# Patient Record
Sex: MALE | Race: WHITE | NOT HISPANIC OR LATINO | Employment: FULL TIME | URBAN - METROPOLITAN AREA
[De-identification: names, ages, dates, MRNs, and addresses within clinical notes are randomized per-mention and may not be internally consistent; named-entity substitution may affect disease eponyms.]

---

## 2021-10-11 ENCOUNTER — APPOINTMENT (OUTPATIENT)
Dept: GENERAL RADIOLOGY | Facility: CLINIC | Age: 51
End: 2021-10-11
Attending: EMERGENCY MEDICINE
Payer: COMMERCIAL

## 2021-10-11 PROCEDURE — 99285 EMERGENCY DEPT VISIT HI MDM: CPT | Mod: 25

## 2021-10-11 PROCEDURE — 71046 X-RAY EXAM CHEST 2 VIEWS: CPT

## 2021-10-12 ENCOUNTER — HOSPITAL ENCOUNTER (EMERGENCY)
Facility: CLINIC | Age: 51
Discharge: HOME | End: 2021-10-12
Attending: EMERGENCY MEDICINE | Admitting: EMERGENCY MEDICINE
Payer: COMMERCIAL

## 2021-10-12 VITALS
RESPIRATION RATE: 16 BRPM | SYSTOLIC BLOOD PRESSURE: 134 MMHG | DIASTOLIC BLOOD PRESSURE: 91 MMHG | HEART RATE: 55 BPM | OXYGEN SATURATION: 99 % | TEMPERATURE: 97.9 F

## 2021-10-12 DIAGNOSIS — R05.9 COUGH: ICD-10-CM

## 2021-10-12 DIAGNOSIS — R07.89 ATYPICAL CHEST PAIN: ICD-10-CM

## 2021-10-12 LAB
ANION GAP SERPL CALCULATED.3IONS-SCNC: 6 MMOL/L (ref 3–14)
BUN SERPL-MCNC: 18 MG/DL (ref 7–30)
CALCIUM SERPL-MCNC: 8.7 MG/DL (ref 8.5–10.1)
CHLORIDE BLD-SCNC: 100 MMOL/L (ref 94–109)
CO2 SERPL-SCNC: 28 MMOL/L (ref 20–32)
CREAT SERPL-MCNC: 1.09 MG/DL (ref 0.66–1.25)
ERYTHROCYTE [DISTWIDTH] IN BLOOD BY AUTOMATED COUNT: 11.9 % (ref 10–15)
GFR SERPL CREATININE-BSD FRML MDRD: 79 ML/MIN/1.73M2
GLUCOSE BLD-MCNC: 90 MG/DL (ref 70–99)
HCT VFR BLD AUTO: 39 % (ref 40–53)
HGB BLD-MCNC: 13.8 G/DL (ref 13.3–17.7)
HOLD SPECIMEN: NORMAL
MCH RBC QN AUTO: 32.1 PG (ref 26.5–33)
MCHC RBC AUTO-ENTMCNC: 35.4 G/DL (ref 31.5–36.5)
MCV RBC AUTO: 91 FL (ref 78–100)
PLATELET # BLD AUTO: 251 10E3/UL (ref 150–450)
POTASSIUM BLD-SCNC: 3.2 MMOL/L (ref 3.4–5.3)
RBC # BLD AUTO: 4.3 10E6/UL (ref 4.4–5.9)
SODIUM SERPL-SCNC: 134 MMOL/L (ref 133–144)
TROPONIN I SERPL-MCNC: <0.015 UG/L (ref 0–0.04)
WBC # BLD AUTO: 6.7 10E3/UL (ref 4–11)

## 2021-10-12 PROCEDURE — 80048 BASIC METABOLIC PNL TOTAL CA: CPT | Performed by: EMERGENCY MEDICINE

## 2021-10-12 PROCEDURE — 36415 COLL VENOUS BLD VENIPUNCTURE: CPT | Performed by: EMERGENCY MEDICINE

## 2021-10-12 PROCEDURE — 84484 ASSAY OF TROPONIN QUANT: CPT | Performed by: EMERGENCY MEDICINE

## 2021-10-12 PROCEDURE — 85027 COMPLETE CBC AUTOMATED: CPT | Performed by: EMERGENCY MEDICINE

## 2021-10-12 RX ORDER — LOSARTAN POTASSIUM 50 MG/1
50 TABLET ORAL DAILY
Qty: 30 TABLET | Refills: 0 | Status: SHIPPED | OUTPATIENT
Start: 2021-10-12

## 2021-10-12 RX ORDER — DOXYCYCLINE 100 MG/1
100 CAPSULE ORAL 2 TIMES DAILY
Qty: 20 CAPSULE | Refills: 0 | Status: SHIPPED | OUTPATIENT
Start: 2021-10-12 | End: 2021-10-22

## 2021-10-12 RX ORDER — BENZONATATE 100 MG/1
200 CAPSULE ORAL 3 TIMES DAILY PRN
Qty: 15 CAPSULE | Refills: 0 | Status: SHIPPED | OUTPATIENT
Start: 2021-10-12

## 2021-10-12 ASSESSMENT — ENCOUNTER SYMPTOMS
SHORTNESS OF BREATH: 1
FEVER: 0
COUGH: 1

## 2021-10-12 NOTE — ED PROVIDER NOTES
History   Chief Complaint:  Shortness of breath    HPI   Geoff Infante is a 51 year old male with history of hypertension who presents with shortness of breath. Per the patient, he has had shortness of breath and chest wall pain. Three weeks ago he developed a cough without blood and 5-6 days later developed the other symptoms, along with dyspnea on exertion. He called nurse triage who recommended he present to the ER. He is COVID vaccinated. He denies current fevers and leg swelling. He also denies recent surgeries and a personal history of blood clots.    Review of Systems   Constitutional: Negative for fever.   Respiratory: Positive for cough and shortness of breath.    Cardiovascular: Positive for chest pain. Negative for leg swelling.   All other systems reviewed and are negative.      Allergies:  The patient has no known allergies.     Medications:  Lisinopril  Chlorthalidone     Past Medical History:     Hypertension    Past Surgical History:    Hernia repair    Social History:  The patient was unaccompanied to the ER  The patient works at [employer].    Physical Exam     Patient Vitals for the past 24 hrs:   BP Temp Temp src Pulse Resp SpO2   10/11/21 2136 (!) 133/96 97.9  F (36.6  C) Temporal 78 16 98 %       Physical Exam  Vitals reviewed.   HENT:      Head: Normocephalic.   Cardiovascular:      Rate and Rhythm: Normal rate and regular rhythm.   Pulmonary:      Effort: Pulmonary effort is normal. No respiratory distress.      Breath sounds: Normal breath sounds.   Abdominal:      General: Abdomen is flat. Bowel sounds are normal.      Palpations: Abdomen is soft.   Skin:     General: Skin is warm.      Capillary Refill: Capillary refill takes less than 2 seconds.   Neurological:      General: No focal deficit present.      Mental Status: He is alert and oriented to person, place, and time.           Emergency Department Course   ECG  ECG obtained at 2312, ECG read at 0201  Normal sinus rhythm  Normal  ECG   Rate 61 bpm. OH interval 152 ms. QRS duration 90 ms. QT/QTc 406/408 ms. P-R-T axes 39 46 11.     Imaging:    Chest XR,  PA & LAT   Final Result   IMPRESSION: Heart size is normal. Lungs are clear. No pneumothorax or pleural effusion.          The above imaging workup was performed.   Report per radiology    Laboratory:  Labs Ordered and Resulted from Time of ED Arrival Up to the Time of Departure from the ED   CBC WITH PLATELETS - Abnormal; Notable for the following components:       Result Value    RBC Count 4.30 (*)     Hematocrit 39.0 (*)     All other components within normal limits   BASIC METABOLIC PANEL - Abnormal; Notable for the following components:    Potassium 3.2 (*)     All other components within normal limits   TROPONIN I - Normal   EXTRA BLUE TOP TUBE   EXTRA RED TOP TUBE   EXTRA GREEN TOP (LITHIUM HEPARIN) TUBE   EXTRA PURPLE TOP TUBE   EXTRA TUBE    Narrative:     The following orders were created for panel order [place] Draw.  Procedure                               Abnormality         Status                     ---------                               -----------         ------                     Extra Blue Top Tube[[id number]]                              Final result               Extra Red Top Tube[[id number]]                               Final result               Extra Green Top (Lithium...[[id number]]                      Final result               Extra Purple Top Tube[[id number]]                            Final result                 Please view results for these tests on the individual orders.      Emergency Department Course:  Reviewed:  I reviewed nursing notes, vitals and past medical history    Assessments:  0154 I obtained history and examined the patient as noted above.     Disposition:  The patient was discharged to home.     Impression & Plan         Medical Decision Making:  Patient presents with cough and shortness of breath.  Patient is well-appearing with a normal  respiratory rate and normal oxygen saturation.  No wheezing auscultated.  Patient was given some cough medication with some improvement in symptoms.  X-rays negative for pneumonia.  Patient is Covid vaccinated.  Patient is having reassurance cough medication and follow-up with primary care.  Chest pain is atypical seems to be associated with coughing.  Patient is PERC negative and no work-up for PE was recommended.  Due to ongoing coughing with phlegm production as well as more than 10 to 10 days of coughing patient was offered antibiotics as a bridge to follow-up with primary care.    Diagnosis:    ICD-10-CM    1. Cough  R05.9    2. Atypical chest pain  R07.89        Discharge Medications:  New Prescriptions    BENZONATATE (TESSALON) 100 MG CAPSULE    Take 2 capsules (200 mg) by mouth 3 times daily as needed for cough    DOXYCYCLINE MONOHYDRATE (MONODOX) 100 MG CAPSULE    Take 1 capsule (100 mg) by mouth 2 times daily for 10 days    LOSARTAN (COZAAR) 50 MG TABLET    Take 1 tablet (50 mg) by mouth daily       Scribe Disclosure:  Bruce FAUSTIN, am serving as a scribe at 1:36 AM on 10/12/2021 to document services personally performed by Ziggy Kruger MD based on my observations and the provider's statements to me.        Ziggy Kruger MD  10/14/21 7835

## 2021-10-12 NOTE — DISCHARGE INSTRUCTIONS
We are recommending an antibiotic due to cough ongoing for 2 weeks.  Your chest x-ray is normal and your lab work is unremarkable.  Consider also lisinopril as a cause for ongoing coughing.  Complete course of antibiotic as well as cough medication use when needed.  If still coughing consider stopping lisinopril and starting losartan.  Follow-up with your regular doctor for reassessment and come in with severe increase in chest pain or shortness of breath.

## 2021-10-12 NOTE — ED TRIAGE NOTES
Pt reports that since the 21st of Sept. For the last few days pt is having coughing fits more frequently. Pt has been having constant chest discomfort. Denies shortness of breath at this time. ABC intact.

## 2021-10-13 LAB
ATRIAL RATE - MUSE: 61 BPM
DIASTOLIC BLOOD PRESSURE - MUSE: NORMAL MMHG
INTERPRETATION ECG - MUSE: NORMAL
P AXIS - MUSE: 39 DEGREES
PR INTERVAL - MUSE: 152 MS
QRS DURATION - MUSE: 90 MS
QT - MUSE: 406 MS
QTC - MUSE: 408 MS
R AXIS - MUSE: 46 DEGREES
SYSTOLIC BLOOD PRESSURE - MUSE: NORMAL MMHG
T AXIS - MUSE: 11 DEGREES
VENTRICULAR RATE- MUSE: 61 BPM

## 2024-09-14 ENCOUNTER — HOSPITAL ENCOUNTER (EMERGENCY)
Facility: CLINIC | Age: 54
Discharge: HOME | End: 2024-09-14
Attending: EMERGENCY MEDICINE | Admitting: EMERGENCY MEDICINE
Payer: COMMERCIAL

## 2024-09-14 VITALS
OXYGEN SATURATION: 99 % | BODY MASS INDEX: 33.14 KG/M2 | WEIGHT: 223.77 LBS | TEMPERATURE: 97.6 F | HEIGHT: 69 IN | RESPIRATION RATE: 18 BRPM | HEART RATE: 68 BPM | SYSTOLIC BLOOD PRESSURE: 152 MMHG | DIASTOLIC BLOOD PRESSURE: 90 MMHG

## 2024-09-14 DIAGNOSIS — U07.1 COVID-19 VIRUS INFECTION: ICD-10-CM

## 2024-09-14 DIAGNOSIS — R21 RASH OF BOTH FEET: ICD-10-CM

## 2024-09-14 PROCEDURE — 99282 EMERGENCY DEPT VISIT SF MDM: CPT

## 2024-09-14 ASSESSMENT — COLUMBIA-SUICIDE SEVERITY RATING SCALE - C-SSRS
2. HAVE YOU ACTUALLY HAD ANY THOUGHTS OF KILLING YOURSELF IN THE PAST MONTH?: NO
1. IN THE PAST MONTH, HAVE YOU WISHED YOU WERE DEAD OR WISHED YOU COULD GO TO SLEEP AND NOT WAKE UP?: NO
6. HAVE YOU EVER DONE ANYTHING, STARTED TO DO ANYTHING, OR PREPARED TO DO ANYTHING TO END YOUR LIFE?: NO

## 2024-09-14 ASSESSMENT — ACTIVITIES OF DAILY LIVING (ADL): ADLS_ACUITY_SCORE: 33

## 2024-09-15 NOTE — ED PROVIDER NOTES
"  Emergency Department Note      History of Present Illness     Chief Complaint   Joint Swelling    HPI   Geoff Infante is a 54 year old male with a history of type II diabetes with no insulin usage presenting with rashes on the bottom of his feet. They developed an hour and a half ago with no apparent cause. There was no fall, injury, or increased walking. The areas are not painful. This has never happened before. No known neuropathy. The patient has been sick for a couple of days and is COVID positive. No other rashes, hives, and mouth sores. No recent camping or tick exposure. The patient denies any other medications.     Independent Historian   None    Review of External Notes   None    Past Medical History     Medical History and Problem List   Diabetes, type II   Presbyopia   Regular astigmatism, bilateral   Myopia, bilateral   Chronic GERD   Umbilical hernia without obstruction and without gangrene   Hypertension   Hyperlipidemia   Impaired fasting glucose     Medications   Losartan   Omeprazole   Chlorthalidone   Aspirin 81 mg   Metformin   Atorvastatin     Surgical History   Hernia   Esophageal dilation     Physical Exam     Patient Vitals for the past 24 hrs:   BP Temp Temp src Pulse Resp SpO2 Height Weight   09/14/24 2026 (!) 152/90 97.6  F (36.4  C) Temporal 68 18 99 % 1.753 m (5' 9\") 101.5 kg (223 lb 12.3 oz)     Physical Exam  General: Sitting on the ED chair  HEENT: Normocephalic, atraumatic  Cardiac: Warm and well perfused  Pulm: Breathing comfortably, no accessory muscle usage, no conversational dyspnea  MSK: No bony deformities  Skin: Warm and dry, there is an approximately 3 x 2 cm of raised erythema over the sole of the right foot in between the first and fifth metatarsal heads, nontender and no open wound no fluctuance.  There is some erythema over the lateral aspect of the left heel that is similarly nontender and without open wound or fluctuance.  Neuro: Moves all extremities  Psych: " Pleasant mood and affect    Diagnostics     Independent Interpretation   None    ED Course      Medications Administered   Medications - No data to display    Procedures   Procedures     Discussion of Management   None    ED Course   ED Course as of 09/15/24 0217   Sat Sep 14, 2024   2104 I obtained the history and examined the patient as above.        Additional Documentation  None    Medical Decision Making / Diagnosis     CMS Diagnoses: None    MIPS     None    [facility]   Geoff Infante is a 54-year-old diabetic male presents with concern for a new rash on the soles of both feet in the context of ongoing COVID infection with symptoms starting couple of days ago.  The rash does not appear to be consistent with a cellulitis or an abscess and is completely nonpainful.  The patient does have diabetes but denies any diabetic foot neuropathy and states he has full sensation in the soles of his feet.  No mouth sores and no other clinical signs of hand-foot and mouth disease.  No camping or tick exposures, low suspicion for [place] spotted fever.  Could be a viral exanthem in the setting of COVID.  Overall no indication for emergent workup or further diagnostics in the ED.  I did offer the patient Paxlovid for his COVID infection but he declined.  Plan is to discharge home with recommendation for PCP follow-up for further evaluation.  The patient expressed understanding and agreement.    Disposition   The patient was discharged.     Diagnosis     ICD-10-CM    1. COVID-19 virus infection  U07.1       2. Rash of both feet  R21                  Scribe Disclosure:  I, Phoenix Peterson, am serving as a scribe at 9:12 PM on 9/14/2024 to document services personally performed by Victoriano Jarvis MD based on my observations and the provider's statements to me.        Victoriano Jarvis MD  09/15/24 0217

## 2024-09-15 NOTE — ED TRIAGE NOTES
Pt reports swelling to the bottom of both feet that started about an hour ago. Pt started with URI symptoms on Thursday and had a positive covid test today.

## 2025-03-05 ENCOUNTER — HOSPITAL ENCOUNTER (EMERGENCY)
Facility: CLINIC | Age: 55
Discharge: HOME | End: 2025-03-05
Payer: COMMERCIAL

## 2025-03-05 VITALS
DIASTOLIC BLOOD PRESSURE: 91 MMHG | OXYGEN SATURATION: 97 % | HEART RATE: 74 BPM | TEMPERATURE: 97.3 F | HEIGHT: 69 IN | SYSTOLIC BLOOD PRESSURE: 143 MMHG | WEIGHT: 215 LBS | RESPIRATION RATE: 16 BRPM | BODY MASS INDEX: 31.84 KG/M2

## 2025-03-05 DIAGNOSIS — M25.531 RIGHT WRIST PAIN: ICD-10-CM

## 2025-03-05 PROCEDURE — 99284 EMERGENCY DEPT VISIT MOD MDM: CPT

## 2025-03-05 PROCEDURE — 250N000013 HC RX MED GY IP 250 OP 250 PS 637

## 2025-03-05 RX ORDER — ACETAMINOPHEN 325 MG/1
975 TABLET ORAL ONCE
Status: COMPLETED | OUTPATIENT
Start: 2025-03-05 | End: 2025-03-05

## 2025-03-05 RX ADMIN — ACETAMINOPHEN 975 MG: 325 TABLET, FILM COATED ORAL at 19:13

## 2025-03-05 ASSESSMENT — COLUMBIA-SUICIDE SEVERITY RATING SCALE - C-SSRS
6. HAVE YOU EVER DONE ANYTHING, STARTED TO DO ANYTHING, OR PREPARED TO DO ANYTHING TO END YOUR LIFE?: NO
1. IN THE PAST MONTH, HAVE YOU WISHED YOU WERE DEAD OR WISHED YOU COULD GO TO SLEEP AND NOT WAKE UP?: NO
2. HAVE YOU ACTUALLY HAD ANY THOUGHTS OF KILLING YOURSELF IN THE PAST MONTH?: NO

## 2025-03-05 ASSESSMENT — ACTIVITIES OF DAILY LIVING (ADL): ADLS_ACUITY_SCORE: 41

## 2025-03-06 NOTE — ED PROVIDER NOTES
"  Emergency Department Note      History of Present Illness     Chief Complaint   Wrist Pain      HPI   Geoff Infante is a 54 year old male, right-handed, with history of DM2, hypertension, hyperlipidemia who presents for evaluation of right wrist pain.  Patient states at 8:00 this morning, he was helping to push a car stuck in the snow.  He had onset of right wrist pain after pushing the car.  Pain has been worsening since.  Pain also worsens with supination and pronation of the right arm.  He denies right elbow or right shoulder pain.  He also denies paresthesia in the right upper extremity.  Denies specific traumatic injuries such as a fall or the wrist being struck.    Independent Historian   None    Review of External Notes   None    Past Medical History     Medical History and Problem List   No past medical history on file.    Medications   benzonatate (TESSALON) 100 MG capsule  losartan (COZAAR) 50 MG tablet        Surgical History   No past surgical history on file.    Physical Exam     Patient Vitals for the past 24 hrs:   BP Temp Temp src Pulse Resp SpO2 Height Weight   03/05/25 1815 (!) 143/91 -- -- -- -- -- -- --   03/05/25 1813 -- 97.3  F (36.3  C) Oral 74 16 97 % 1.753 m (5' 9\") 97.5 kg (215 lb)     Physical Exam  Constitutional: Alert, attentive, GCS 15   HENT:    Nose: Nose normal.    Mouth/Throat: mucous membranes are moist   Eyes: No redness or drainage  CV: regular rate and rhythm, 2+ radial and ulnar pulses to the bilateral upper extremities   Chest: Effort normal.   GI:   There is no tenderness. No distension. Normal bowel sounds   Neurological: 5/5 strength to the radian, ulnar and median motor distributions;   sensation intact to light touch to the radian, ulnar and median distributions   MSK: There is mild swelling and tenderness of the right forearm and right wrist, muscle compartments are soft.  Decreased range of motion of supination and pronation.  No tenderness to palpation of the " right hand or fingers.  Neurovascularly intact distal, brisk cap refill all fingers of right hand.  No snuffbox tenderness  Skin: Skin is warm and dry.        Diagnostics     Lab Results   Labs Ordered and Resulted from Time of ED Arrival to Time of ED Departure - No data to display    Imaging   XR Wrist Right G/E 3 Views   Final Result   IMPRESSION: Mild osteoarthrosis of the STT joint, first CMC joint and first MCP joint. No subluxation or dislocation. No fracture.              Independent Interpretation   X-ray right wrist shows no fractures or dislocations    ED Course      Medications Administered   Medications   acetaminophen (TYLENOL) tablet 975 mg (975 mg Oral $Given 3/5/25 6173)       Procedures   Procedures     Discussion of Management   None    ED Course        Additional Documentation  None    Medical Decision Making / Diagnosis     CMS Diagnoses: None    MIPS       None    [facility]   Geoff Infante is a 54 year old male, right-handed, with history of DM2, hypertension, hyperlipidemia who presents for evaluation of right wrist pain.  Differential includes but is not limited to fracture, dislocation, compartment syndrome, ligamentous injury, de Quervain tendinopathy, scaphoid fracture among others.  Vital signs reassuring.  On physical exam, there was tenderness to palpation across his right wrist, also compartments were soft, 2+ radial and ulnar pulses, brisk cap refill.  Clinical exam was not consistent with compartment syndrome or scaphoid fracture.  An x-ray was obtained of the right wrist, negative for fractures or subluxations or other acute pathology.  Clinical presentation is consistent with a tendon/ligament injury such as sprain or strain.  Will place patient in a splint and recommend orthopedic follow-up for further evaluation.  Discussed return precautions with patient including worsening pain, numbness or tingling in his hand or fingers, weakness in the right hand among others.  Patient states  he understands and agrees.  Patient was discharged.    Disposition   The patient was discharged.     Diagnosis     ICD-10-CM    1. Right wrist pain  M25.531 CANCELED: Wrist/Arm/Hand Bracing Supplies Order Wrist Brace; Right; non-thumb spica           Discharge Medications   Discharge Medication List as of 3/5/2025  7:14 PM            JALEN Grant John, PA-C  03/05/25 9938

## 2025-03-06 NOTE — ED TRIAGE NOTES
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 Pt alert, oriented x3 ABCS intact     Triage Assessment (Adult)       Row Name 03/05/25 5208          Triage Assessment    Airway WDL WDL        Respiratory WDL    Respiratory WDL WDL        Skin Circulation/Temperature WDL    Skin Circulation/Temperature WDL WDL        Cardiac WDL    Cardiac WDL WDL        Peripheral/Neurovascular WDL    Peripheral Neurovascular WDL WDL        Cognitive/Neuro/Behavioral WDL    Cognitive/Neuro/Behavioral WDL WDL

## 2025-03-06 NOTE — DISCHARGE INSTRUCTIONS
Thank you for coming to [facility] emergency department.  Your x-ray was negative for fractures or dislocations bones of your right wrist.  You most likely strained a ligament or tendon tear.  Today.  Please keep wrist in splint, apply ice 4 times per day for 15 minutes, alternate Tylenol with ibuprofen every 6 hours.  If pain does not improve within 1 week, please schedule a follow-up appointment with [facility].  Please return to the emergency department if you have worsening pain, numbness or tingling in your right hand, or if you have any other concerns.